# Patient Record
(demographics unavailable — no encounter records)

---

## 2024-11-07 NOTE — HISTORY OF PRESENT ILLNESS
[FreeTextEntry1] : Ms. Pinto presents for follow up and management of mild CAD.  Her sister, who is 52 years old and healthy, had a screening CT coronary artery calcium scan and had a value in the 400s.  In addition, her father had CAD at a young ager.  She is active, running and bicycling regularly.   She denies chest pain and has HAYES to multiple flights of stairs.  We had a discussion about the clinical implications of her sister's scan for her health.  On 05/29/24, she had a CCTA which revealed a CAC score of 14, mLAD mild, and was otherwise normal.

## 2024-11-07 NOTE — ASSESSMENT
[FreeTextEntry1] : ======================================================================================= 1. CAD, mild, + FHx of CAD: sister at 53 y/o, father at a young age: s/p 05/29/24: CCTA: CAC 14, mLAD mild, otherwise normal:      - will pursue medical management     - no indication for antiplatelet therapy at this time     - continue atorvastatin 20mg po qd for CV prevention      - patient will provide copy of recent lab work from PMD's office for my review

## 2024-11-07 NOTE — REASON FOR VISIT
[FreeTextEntry1] : ======================================================================= Diagnostic Tests: -------------------------------------------------------------- EC24: sinus bradycardia at 51 bpm, otherwise normal ECG.  24: sinus bradycardia at 48 bpm, inferolateral early repolarization abnormalities.  -------------------------------------------------------------- CT: 24: CCTA: CAC 14, mLAD mild, otherwise normal.

## 2024-11-07 NOTE — ASSESSMENT
[FreeTextEntry1] : ======================================================================================= 1. CAD, mild, + FHx of CAD: sister at 51 y/o, father at a young age: s/p 05/29/24: CCTA: CAC 14, mLAD mild, otherwise normal:      - will pursue medical management     - no indication for antiplatelet therapy at this time     - continue atorvastatin 20mg po qd for CV prevention      - patient will provide copy of recent lab work from PMD's office for my review